# Patient Record
Sex: FEMALE | Race: WHITE | NOT HISPANIC OR LATINO | URBAN - METROPOLITAN AREA
[De-identification: names, ages, dates, MRNs, and addresses within clinical notes are randomized per-mention and may not be internally consistent; named-entity substitution may affect disease eponyms.]

---

## 2017-03-27 ENCOUNTER — OFFICE VISIT - HEALTHEAST (OUTPATIENT)
Dept: ADDICTION MEDICINE | Facility: CLINIC | Age: 52
End: 2017-03-27

## 2017-03-27 DIAGNOSIS — F10.20 ALCOHOL USE DISORDER, SEVERE, DEPENDENCE (H): ICD-10-CM

## 2017-04-04 ENCOUNTER — COMMUNICATION - HEALTHEAST (OUTPATIENT)
Dept: ADDICTION MEDICINE | Facility: CLINIC | Age: 52
End: 2017-04-04

## 2017-06-20 ENCOUNTER — AMBULATORY - HEALTHEAST (OUTPATIENT)
Dept: BEHAVIORAL HEALTH | Facility: CLINIC | Age: 52
End: 2017-06-20

## 2021-06-09 NOTE — PROGRESS NOTES
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 of making excuses.     5. What would assist you in staying sober/straight?     Client states that it would be helpful for her to gain coping skills for her use and her depression, and to regularly attend AA.     Dimension V Ratings   Relapse/Continued Use/Continued problem potential - The placing authority must use the criteria in Dimension V to determine a client s relapse, continued use, and continued problem potential.   RISK DESCRIPTIONS - Severity ratin  No awareness of the negative impact of mental health problems or substance abuse.  No coping skills to arrest mental health or addiction illnesses, or prevent relapse.    REASONS SEVERITY WAS ASSIGNED - (What information did the person provide that indicates his or her understanding of relapse issues? What about the person s experience indicates how prone he or she is to relapse? What coping skills does the person have that decrease relapse potential?)      Client verbalizes that she does not have proper coping skills to cope with her stress and to arrest her substance use. Client appears to neglect how her substance use is impacting her mental and physical health, and continues to drink despite  knowledge of the negative effects. Client appears to lack skills to arrest her use and prevent relapse, and client appears to lack skills to manage her mental health effectively at this time. Due to these things, client appears to be at an increased risk of continued use at this time.          DIMENSION VI - Recovery Environment   1. Are you employed/attending school? Tell me about that.     Client is currently unemployed and collects disability.    2A. Describe a typical day; evening for you. Work, school, social, leisure, volunteer, spiritual practices. Include time spent obtaining, using, recovering from drugs or alcohol. (DSM)     Wake up, watch TV, coffee, group 3 days per week @ [facility] (not technically a patient anymore, but is able to attend), come home, cross stitch, lunch, cross stitch, supper, settle in for the evening, watch TV, bed.   Client reports that on days that she drinks she wakes up and starts drinking, and then spends the entire day in front of the TV, drinking throughout the entire day.     2B. How often do you spend more time than you planned using or use more than you planned? (DSM)     Almost all of the time.     3. How important is using to your social connections? Do many of your family or friends use?     Not important.     4A. Are you currently in a significant relationship?  no  4B.  If yes, how long?    4C. Sexual Orientation:  heterosexual    5A. Who do you live with? alone.  5B. Tell me about their alcohol/drug use and mental health issues. NA.  5C. Are you concerned for your safety there? no  5D. Are you concerned about the safety of anyone else who lives with you? no    6A. Do you have children who live with you? no  If the person lives with children, ask follow-up questions to determine the person's relationship and responsibility, both legal and care giving; and what arrangements are made for supervision for the children when the person is not available.      6B. Do you  have children who do not live with you?  no  If yes, ask follow up questions to learn where the children are, who has custody and what the person't relaltionship and responsibility is with these children and what hopes the person has for his or her future with these children.    7A. Who supports you in making changes in your alcohol or drug use? What are they willing to do to support you? Who is upset or angry about you making changes in your alcohol or drug use? How big a problem is this for you?      Oldest sister, family friend/friend of oldest sister, head counselor at [facility],  members and [facility] members.       7B. This table is provided to record information about the person s relationships and available support It is not necessary to ask each item; only to get a comprehensive picture of their support system.  How often can you count on the following people when you need someone?   Partner / Spouse    Parent(s)/Aunt(s)/Uncle(s)/Grandparents    Sibling(s)/Cousin(s) Always supportive   Child(elvis)    Other relative(s)    Friend(s)/neighbor(s) Usually supportive   Child(elvis) s father(s)/mother(s)    Support group member(s) Always supportive   Community of m[facility] members    /counselor/therapist/healer Always supportive   Other (specify)      8A. What is your current living situation?     Client reports that she currently lives alone in an apartment.     8B. What is your long term plan for where you will be living?    Client reports that she plans to stay where she is currently living.     8C. Tell me about your living environment/neighborhood? Ask enough follow up questions to determine safety, criminal activity, availability of alcohol and drugs, supportive or antagonistic to the person making changes.      Safe, no concerns mentioned at this time.     9. Criminal justice history: Gather current/recent history and any significant history related to substance use--Arrests?  Convictions? Circumstances? Alcohol or drug involvement? Sentences? Still on probation or parole? Expectations of the court? Current court order? Any sex offenses - lifetime? What level? (DSM)    None currently.   Past- 2 property damage charges, terroristic threats, misdemeanor- 5th degree assault    10. What obstacles exist to participating in treatment? (Time off work, childcare, funding, transportation, pending custodial time, living situation)    MH issues- struggles at times to share in groups beucase of trust issues. No barriers to getting into treatment.      Dimension VI Ratings   Recovery environment - The placing authority must use the criteria in Dimension VI to determine a client s recovery environment.   RISK DESCRIPTIONS - Severity rating: 3  Client is not engaged in structured, meaningful activity and the client's peers, family , significant other, and living environment are unsupportive, or there is significant criminal justice system involvement.    REASONS SEVERITY WAS ASSIGNED - (What support does the person have for making changes? What structure/stability does the person have in his or her daily life that willincrease the likelihood that changes can be sustained? What problems exist in the person s environment that will jeopardize getting/staying clean and sober?)     Client appears to lack structured and meaningful activity to full her days, as she is unemployed currently and does not appear to have much structure. Client reports that she does have some support in her life at this time, but also reported that much of her support is upset with her currently because of things that she has said and done while drinking. Client denies any current legal involvement.                Client Choice/Exceptions     Would you like services specific to language, age, gender, culture, Scientology preference, race, ethnicity, sexual orientation or disability?  no    If yes, specify:      What particular treatment  choices and options would you like to have?  Inpatient.     Do you have a preference for a particular treatment program?  [facility]        DSM-V Criteria for Substance Abuse  Instructions  Determine whether the client currently meets the criteria for a Substance Use Disorder using the diagnostic criteria in the DSM-V, pp. 481-584. Current means during the most recent 12 months outside a facility that controls access to substances.    Category of substance Severity ICD-10 Code/DSM V Code   [x]  Alcohol Use Disorder [] Mild  [] Moderate  [x] Severe [] (F10.10) (305.00)  [] (F10.20) (303.90)  [x] (F10.20) (303.90)   []  Cannabis Use Disorder [] Mild  [] Moderate  [] Severe [] (F12.10) (305.20)  [] (F12.20) (304.30)  [] (F12.20) (304.30)   [] Hallucinogen Use Disorder [] Mild  [] Moderate  [] Severe [] (F16.10) (305.30)  [] (F16.20) (304.50)  [] (F16.20) (304.50)   []  Inhalant Use Disorder [] Mild  [] Moderate  [] Severe [] (F18.10) (305.90)  [] (F18.20) (304.60)  [] (F18.20) (304.60)   []  Opioid Use Disorder [] Mild  [] Moderate  [] Severe [] (F11.10) (305.50)  [] (F11.20) (304.00)  [] (F11.20) (304.00)   []  Sedative, Hypnotic, or Anxiolytic Use Disorder [] Mild  [] Moderate  [] Severe [] (F13.10) (305.40)   [] (F13.20) (304.10)  [] (F13.20) (304.10)   []  Stimulant Related Disorders [] Mild  [] Moderate  [] Severe [] (F15.10) (305.70) Amphetamine type substance  [] (F14.10) (305.60) Cocaine  [] (F15.10) (305.70) Other or unspecified stimulant  [] (F15.20) (304.40) Amphetamine type substance  [] (F14.20) (304.20) Cocaine  [] (F15.20) (304.40) Other or unspecified stimulant  [] (F15.20) (304.40) Amphetamine type substance  [] (F14.20) (304.20) Cocaine  [] (F15.20) (304.40) Other or unspecified stimulant   []  Tobacco use Disorder [] Mild  [] Moderate  [] Severe [] (Z72.0) (305.1)  [] (F17.200) (305.1)  [] (F17.200) (305.1)   []  Other (or unknown) Substance Use Disorder [] Mild  [] Moderate  [] Severe [] (F19.10)  (305.90)  [] (F19.20) (304.90)  [] (F19.20) (304.90)       Suggested Level of Care Necessary for Recovery  [x]  Inpatient  []  Extended Care []  Residential []  Outpatient  []  None            Collateral Contact Summary   Number of contacts made:  2  Contact with referring person:  yes     If court related records were reviewed, summarize here:  NA     [x]   Information from collateral contacts supported/largely agreed with information from the client and associated risk ratings.   []   Information from collateral contacts was significantly different from information from the client and lead to different risk ratings.      Summarize new information here:      Rule 25 Assessment Summary and Plan   's Recommendation    Based on the information gathered in this assessment and from collateral information, the client meets DSM IV criteria for Alcohol Use Disorder, Severe (F10.20).   Client appears appropriate for inpatient treatment at this time.           Collateral Contacts     Please duplicate this page for each contact.  If this includes information which is sensitive and not public, separate this page from the rest of the assessment before sharing.  Retain the page in the assessment file.   Name    Shyann Beck Relationship    [facility] / Phone Number    [phone] Releases    yes       Information Provided:      Attempted to contact Shyann 3/27/17 @ 2:25pm. No answer, left message to return call with any information.     Shyann reported the following via voicemail on 3/27/17 @ 3:13pm:    Client was doing well after a major relapse on alcohol.   Client was attending [facility] and was able to gain skills and support.   Client stayed sober for some time due to that, but due to unsuccessful employment placements and her struggles with coping skills she returned to drinking.   Client has gone down hill drastically in the past 3 months in regards to her drinking.      Concerned because client will call her office drunk and becomes very angry, and belligerent toward her and her staff. Client has temper problems during these times as well. Reports that client will then have no memory of doing this or what she said.        Collateral Contacts     Name    Iris Westfallford    Relationship    Therapist Phone Number    [phone] Releases    yes       Information Provided:      Attempted to contact Iris 3/27/17 @ 2:28pm. No answer, left message to return call with any information.     Iris reported the following on 3/30/17 @ 2:25pm via voicemail:    Client has been struggling with alcohol addiction for many years.   She began seeing the client a few months ago for therapy.   Client had just finished an OP program prior to that, and was attending [facility].   Client began to struggle again with binge drinking (drinking for 2 days and then not for 2-3 days, consuming 1 liter each day).   She does not know of any other use from the client.   Client's functioning is impaired by her use as she misses appointments, and her attendance has been very impaired.   Client is not currently working, and her relationships are strained because of her use.   Client does not do anything some days because of her drinking.           Staff Name and Title:  SHER Knox    Date:  3/27/2017  Time:  9:02 AM

## 2021-06-11 NOTE — PROGRESS NOTES
[facility] CHEMICAL DEPENDENCY  DISCHARGE SUMMARY            NAME:  Yaneli Cohn   Physician:  Dr. Delgadillo   MRN:  [id number] :  Ofe Banegas   #:  [id number] Funding Source:  Medicare A&B, [employer]   Admit Date:  (Not on file) Discharge Date:  17   :  1965 Days Completed:  10 days   Initial Diagnosis:    Patient Active Problem List   Diagnosis     Alcohol use disorder, severe, dependence     Essential hypertension     Insomnia     Schizoid personality disorder     Type 2 diabetes mellitus     Borderline personality disorder     Peripheral nerve disease     Patient's other noncompliance with medication regimen     Schizoaffective disorder, bipolar type     Adiposity     Dyslipidemia     RAYMOND (obstructive sleep apnea)    Final Diagnosis:  Alcohol use disorder - severe   Discharge Address:  [address] [place] [address]      Discharge Type:  With Staff Approval (WSA)    Reasons for and circumstances of service termination:  Client completed 10 days of inpatient treatment and Goal for Dimension 6 - Recovery Environment.     Dimension/Course of Treatment/Individualized Care:   1.  Withdrawal Potential - Risk level - 0 - No concern.  No detox needed.  Last drank 4/15/17.  Risk level remains a 0 - No concern on discharge.     2.  Biomedical Conditions and Complications - Risk level - 2 - Moderate concern.  Client states that she has Type II diabetes - insulin dependent.  Client also reports arthritis in her knees, hypertension.  Client was monitored for her diabetes and hypertension.  Risk level remains a 2 - Moderate concern on discharge.       3.  Emotional/Behavioral/Cognitive Conditions and Complications - Risk level - 2 - Moderate concern.  Client states she has diagnosis of schizoiod personality disorder, borderline personality disorder, schizoaffective disorder - bipolar type, antisocial features.  Scored a low risk on PANSI.  Past histry of multiple  psychiatric admissions as well as MI Commitments.  History of suicide attempts, last 2003.  History of cutting in distant past.  Client was monitored and was med compliant.  Client has a  CM who came in for conference.  Risk level remains a 2 - Moderate concern on discharge.       4.  Treatment Acceptance/Resistance - Risk Level - 0 - No concern.  Client states that she wants treatment, to stop drinking to help her control her diabetes and ot distant her family from here further.  Risk level remains a 0 - No concern on discharge.     5.  Relapse/Continued Use/Continued Problem Potential - Risk level - 4 - Extreme concern.  Client has had three prior treatments.  Was in OP treatment at [facility] prior to admission here.  Client currently lacks relapse prevention strategies.  Client verbalized her 10 consequences of use as well as triggers and stressors.  Client verbalized changes she needs to make in treatment.  Risk level 3 - Serious concern on discharge.  Client will continue to work on relapse issues in OP treatment at [facility], which she will return to 5/5/17.       6.  Recovery Environment - Risk level - 3 - Serious concern.  Client states that she lives alone in her own apartment.  Has  CM through [facility].  Had been attending abstinent based community support groups but continuing to drink.  Conference was held with CM who also picked client up from treatment and brought her home.  Client shared what blocks her from utilizing her sober support network and leisure activities.  Risk level remains a 3 - Serious concern on discharge.  Client will continue to work on Recovery Environment in OP treatment and with her CM.       Strengths and Needs and Services Provided:  Willing.  Needs:  To stop using.  Services provided:  Medical, groups, lectures, films, spirituality groups, OT/RT, conference with CM, discharge planning, 1-1's with counselor.         Program  Involvement: Fair  Attendance: Good  Ability to relate in group/   Other program activities: Good  Assignment Completion: Good  Overall Behavior: Fair  Reported Family/Significant   Other Involvement: Fair    Prognosis: Fair      Recommendations       Attend 12 Step Meetings, Obtain/Retain 12 Step Program Sponsor, Discharged to Other CD Services, Identify and Maintain a Sober Social and Network of Friends    Mental Health Referral  Individual Therapy and Med Compliance      Physical Health Referral:  Personal Physician                Counselor Name and Title:  SHER Crow        Date:  6/21/2017  Time:  11:02 AM

## 2021-06-15 PROBLEM — G47.33 OSA (OBSTRUCTIVE SLEEP APNEA): Chronic | Status: ACTIVE | Noted: 2017-04-19

## 2021-06-15 PROBLEM — F60.1 SCHIZOID PERSONALITY DISORDER (H): Status: ACTIVE | Noted: 2017-04-19

## 2021-06-15 PROBLEM — F10.20 ALCOHOL USE DISORDER, SEVERE, DEPENDENCE (H): Status: ACTIVE | Noted: 2017-04-18

## 2021-06-15 PROBLEM — F60.3 BORDERLINE PERSONALITY DISORDER (H): Status: ACTIVE | Noted: 2017-04-19

## 2021-06-15 PROBLEM — F25.0 SCHIZOAFFECTIVE DISORDER, BIPOLAR TYPE (H): Status: ACTIVE | Noted: 2017-02-03

## 2021-07-14 PROBLEM — F31.9 BIPOLAR AFFECTIVE DISORDER (H): Status: RESOLVED | Noted: 2017-04-19 | Resolved: 2017-04-27

## 2021-07-14 PROBLEM — F60.2: Status: RESOLVED | Noted: 2017-04-19 | Resolved: 2017-04-28
